# Patient Record
Sex: FEMALE | Race: WHITE | Employment: FULL TIME | URBAN - METROPOLITAN AREA
[De-identification: names, ages, dates, MRNs, and addresses within clinical notes are randomized per-mention and may not be internally consistent; named-entity substitution may affect disease eponyms.]

---

## 2020-10-05 ENCOUNTER — OFFICE VISIT (OUTPATIENT)
Dept: ORTHOPEDIC SURGERY | Age: 65
End: 2020-10-05
Payer: COMMERCIAL

## 2020-10-05 VITALS — TEMPERATURE: 97.4 F

## 2020-10-05 PROCEDURE — 99203 OFFICE O/P NEW LOW 30 MIN: CPT | Performed by: PHYSICIAN ASSISTANT

## 2020-10-05 NOTE — LETTER
[facility]  [facility] [address] [address]  Phone: [phone]  Fax: [phone]    Buck Wright        October 5, 2020     Patient: Reji Guo   YOB: 1955   Date of Visit: 10/5/2020       To Whom it May Concern:    Franky Mak was seen in my clinic on 10/5/2020. If you have any questions or concerns, please don't hesitate to call.     Sincerely,           EMIL Wright

## 2020-10-05 NOTE — LETTER
[facility]  [facility] [address] [address]  Phone: [phone]  Fax: [phone]    Buck Thomas        October 5, 2020     Patient: Reuben Lira   YOB: 1955   Date of Visit: 10/5/2020       To Whom It May Concern: It is my medical opinion that Akshat Amor will miss work on October 8, 2020 for a period of time for a doctors appointment. If you have any questions or concerns, please don't hesitate to call.     Sincerely,          EMIL Thomas

## 2020-10-06 PROBLEM — M25.561 RIGHT KNEE PAIN: Status: ACTIVE | Noted: 2020-10-06

## 2020-10-06 PROBLEM — M25.551 RIGHT HIP PAIN: Status: ACTIVE | Noted: 2020-10-06

## 2020-10-06 PROBLEM — M16.11 ARTHRITIS OF RIGHT HIP: Status: ACTIVE | Noted: 2020-10-06

## 2020-10-06 NOTE — PROGRESS NOTES
Subjective:      Patient ID: Anais Gomez is a 60 y.o. female. Chief Complaint   Patient presents with    Hip Problem     Right hip    Knee Problem     Right knee        HPI:   She is here for an initial evaluation of right hip pain. Onset of symptoms years. These symptoms have been progressive in nature. There is not a history of injury. Pain is constant, moderate. Location of pain buttock, groin, anterior thigh to her right knee. . Quality of pain is aching and throbbing. Pain is on average 9/10. Pain is worse with weight bearing activity. Pain improves with rest and elevation. There typically is not associated numbness/ tingling. Previous treatments have included: Tylenol with mild relief or improvement. Review of Systems:   A 14 point review of systems and history form completed by the patient has been reviewed. This form is scanned in the media tab of the patient's chart under today's date. Past Medical History:   Diagnosis Date    Allergic rhinitis     Hyperlipidemia     Hypothyroidism     Iron deficiency anemia     Type II or unspecified type diabetes mellitus without mention of complication, not stated as uncontrolled        History reviewed. No pertinent family history. Past Surgical History:   Procedure Laterality Date    APPENDECTOMY      CHOLECYSTECTOMY      gallstones    GASTRIC BYPASS SURGERY      TUBAL LIGATION      UTERINE FIBROID SURGERY         Social History     Occupational History    Not on file   Tobacco Use    Smoking status: Never Smoker    Smokeless tobacco: Never Used   Substance and Sexual Activity    Alcohol use: No    Drug use: No    Sexual activity: Never       Current Outpatient Medications   Medication Sig Dispense Refill    sitaGLIPtan (JANUVIA) 100 MG tablet Take 1 tablet by mouth daily.  14 tablet 0    levothyroxine (SYNTHROID) 25 MCG tablet TAKE ONE TABLET BY MOUTH EVERY DAY 30 tablet 1    metFORMIN ER (GLUCOPHAGE-XR) 500 MG XR tablet TAKE ONE TABLET BY MOUTH EVERY DAY 30 tablet 1    ramipril (ALTACE) 2.5 MG capsule TAKE ONE CAPSULE BY MOUTH EVERY DAY 30 capsule 1    rosuvastatin (CRESTOR) 10 MG tablet Take 1 tablet by mouth daily. 28 tablet 0    vitamin B-12 (CYANOCOBALAMIN) 500 MCG tablet TAKE 1 TABLET BY MOUTH ONCE DAILY 30 tablet 2    mometasone (NASONEX) 50 MCG/ACT nasal spray 2 sprays by Nasal route daily. 1 Inhaler 0    EVISTA 60 MG tablet TAKE ONE TABLET BY MOUTH EVERY DAY 30 tablet 2    EVISTA 60 MG tablet TAKE ONE TABLET BY MOUTH EVERY DAY 30 tablet 1    Vitamin D (CHOLECALCIFEROL) 1000 UNITS CAPS capsule Take 1,000 Units by mouth daily.  cetirizine-psuedoephedrine (ZYRTEC-D ALLERGY & CONGESTION) 5-120 MG per tablet TAKE ONE TABLET BY MOUTH TWICE A DAY 60 tablet 1    glucose blood VI test strips (ACCU-CHEK CHARLEY) strip [employer] brand 100 strip 2    Blood Glucose Monitoring Suppl ANATOLIY [employer] brand 1 Device 0    metformin (GLUCOPHAGE XR) 500 MG XR tablet Take 500 mg by mouth. 30 tablet 3     Current Facility-Administered Medications   Medication Dose Route Frequency Provider Last Rate Last Dose    bupivacaine PF (MARCAINE) 0.25 % injection 75 mg  30 mL Intradermal Once Michalene Gosselin, MD        mepivacaine (CARBOCAINE) 1 % injection 5 mg  5 mg Intradermal Once Denise Ch MD        methylPREDNISolone acetate (DEPO-MEDROL) injection 40 mg  40 mg Intra-Lesional Once Denise Ch MD        cyanocobalamin injection 100 mcg  100 mcg Intramuscular Once Denise Ch MD             Objective:   She is alert, oriented x 3, pleasant, well nourished, developed and in no acute distress. Temp 97.4 °F (36.3 °C) (Temporal)      HIP EXAM:  Examination of the Right hip shows: There is no deformity. There is not erythema. There is moderate pain with internal and external rotation. There is moderate pain with flexion and extension. There is moderate pain with active SLR.   ROM diminished range of motion with pain.  Trochanteric region is not tender to palpation. Sacral Iliac is not tender to palpation. There is moderate pain with weight bearing. HIP EXAM:  Examination of the Left hip shows: There is no deformity. There is not erythema. There is no pain with internal and external rotation. There is no pain with flexion and extension. There is no pain with active SLR. ROM full range of motion. Trochanteric region is not tender to palpation. Sacral Iliac is not tender to palpation. There is no pain with weight bearing. NEUROLOGICAL EXAM:  Examination of the lower extremities are intact with sensation to light touch. Motor testing 4+ o 5/5 in all major motor groups including hip abductors, hip adductors,  quadriceps, hamstring, dorsi flexors and EHL testing. Gait is antalgic. Negative Longoria's Sign. SLR negative. VASCULAR EXAM:  Examination of the lower extremities shows intact perfusion to all extremities. No cyanosis. Digits are warm to touch, capillary refill is less than 2 seconds. no edema noted. SKIN:  Examination of the lower extremity skin reveals: The skin to be intact without lacerations or abrasions. No significant erythema. No rashes or skin lesions. X Rays: performed in the office today:   AP Pelvis, AP and Frog Leg Lateral  Right Hip:    The alignment is anatomic. There are radiographic findings to suggest moderate degenerative changes of the right hip. There are no radiographic findings to suggest fracture or dislocation. AP Standing, Lateral and Sunrise views of right knee: There is mild osteoarthritic findings of the right knee noted with mild joint space narrowing and early sclerosis of the medial compartment. No acute fractures or dislocations noted. Assessment:       ICD-10-CM    1. Right hip pain  M25.551 XR HIP 2-3 VW W PELVIS RIGHT   2. Right knee pain, unspecified chronicity  M25.561 XR KNEE RIGHT (3 VIEWS)   3.  Arthritis of right hip M16.11         Plan:     I did spend 30 minutes in the office today with greater than 50% of this time counseling, reviewing diagnostic tests, face to face discussion concerning their diagnosis and treatment options. All of their questions were answered. Right buttock and groin pain that radiates down into the thigh to the knee which I believe is related to right hip arthritis. Other considerations are lumbar radicular pain and knee arthritis. The natural history of the patient's diagnosis as well as the treatment options were discussed in full and questions were answered. Risks and benefits of the treatment options also reviewed in detail. Possible Weight loss, activity modification, home exercise therapy program, NSAID'S, dietary changes have been discussed as a means to help control the symptoms. Step approach discussed for treatment of hip arthritis including the use of NSAID'S, cortisone injections as well as surgical procedures such as arthroplasty. I have recommended intra articular injection of the right hip for both diagnostic and therapeutic purpose. This will be performed under fluoroscopy to be sure the injection is indeed intra articular. This will be performed by Dr Nirav Jj and scheduled in the near future. The risks and benefits were discussed in detail today. All questions were answered. She verbally consents to the procedure of cortisone hip injection(s). Follow Up: 6 weeks. Call or return to clinic prn if these symptoms worsen or fail to improve as anticipated.

## 2020-10-15 ENCOUNTER — HOSPITAL ENCOUNTER (OUTPATIENT)
Dept: GENERAL RADIOLOGY | Age: 65
Discharge: HOME | End: 2020-10-15
Payer: COMMERCIAL

## 2020-10-15 ENCOUNTER — OFFICE VISIT (OUTPATIENT)
Dept: ORTHOPEDIC SURGERY | Age: 65
End: 2020-10-15
Payer: COMMERCIAL

## 2020-10-15 PROCEDURE — 77002 NEEDLE LOCALIZATION BY XRAY: CPT

## 2020-10-15 PROCEDURE — 6360000002 HC RX W HCPCS

## 2020-10-15 PROCEDURE — 20610 DRAIN/INJ JOINT/BURSA W/O US: CPT

## 2020-10-15 PROCEDURE — 2500000003 HC RX 250 WO HCPCS

## 2020-11-12 ENCOUNTER — OFFICE VISIT (OUTPATIENT)
Dept: ORTHOPEDIC SURGERY | Age: 65
End: 2020-11-12
Payer: COMMERCIAL

## 2020-11-12 VITALS — BODY MASS INDEX: 33.13 KG/M2 | HEIGHT: 62 IN | WEIGHT: 180 LBS | TEMPERATURE: 97.2 F

## 2020-11-12 PROCEDURE — 99213 OFFICE O/P EST LOW 20 MIN: CPT | Performed by: ORTHOPAEDIC SURGERY

## 2020-11-12 NOTE — PROGRESS NOTES
[facility]  Office Visit    Chief Complaint: Follow-up on right hip pain. Low back pain. HPI:  Srinivasa Li is a 60 y.o. who is here in follow-up of right hip pain. She underwent an injection on October 15, 2020 with Dr. Gayla Paniagua. This did help her pain that was on her lateral anterior hip and in her thigh. However, she continues to notice that she has pain in her upper lumbar spine. This pain is central and radiates to the right flank. This has been present for the last 6 months and has been worsening lately. This pain does not radiate down her legs. She has tried NSAIDs, but these do not help. She denies recent history of back injury. Patient Active Problem List   Diagnosis    Type II or unspecified type diabetes mellitus without mention of complication, not stated as uncontrolled    Hyperlipidemia    Hypothyroidism    Iron deficiency anemia    Vitamin B12 deficiency    Bursitis of hip    Dermatophytosis of nail    Menopause    Greater trochanteric bursitis of right hip    Rotator cuff tendonitis    Impingement syndrome of right shoulder    AC (acromioclavicular) joint arthritis    Biceps tendonitis    Right hip pain    Right knee pain    Arthritis of right hip       ROS:  Constitutional: denies fever, chills, weight loss  MSK: denies pain in other joints, muscle aches  Neurological: denies numbness, tingling, weakness    Exam:  Temperature 97.2 °F (36.2 °C), temperature source Infrared, height 5' 2\" (1.575 m), weight 180 lb (81.6 kg). Appearance: sitting in exam room chair, appears to be in no acute distress, awake and alert  Resp: unlabored breathing on room air  Skin: warm, dry and intact with out erythema or significant increased temperature  Neuro: grossly intact both lower extremities. Intact sensation to light touch. Motor exam 4+ to 5/5 in all major motor groups.   MSK: Spine -tender over the midline of the lower thoracic and upper lumbar spine.  Right hip -negative Stinchfield. No pain with passive range of motion. Imagin views of the thoracolumbar spine were obtained and reviewed. Significant for degenerative scoliosis and degenerative disc disease, most significant at L4/5. Assessment:  Right hip osteoarthritis  Degenerative scoliosis    Plan:  We discussed the diagnosis and treatment options. She is still receiving good relief from her right hip steroid injection. However, she continues to have midline and right-sided thoracolumbar back pain. This is in the area of her degenerative scoliosis. Due to the midline pain, we will obtain an MRI to further evaluate the vertebrae at this level to evaluate for fracture versus discitis. We will see her back after the MRI. This dictation was done with [employer] dictation and may contain mechanical errors related to translation.

## 2020-12-07 ENCOUNTER — OFFICE VISIT (OUTPATIENT)
Dept: ORTHOPEDIC SURGERY | Age: 65
End: 2020-12-07
Payer: COMMERCIAL

## 2020-12-07 VITALS — TEMPERATURE: 97.2 F

## 2020-12-07 PROCEDURE — 99213 OFFICE O/P EST LOW 20 MIN: CPT | Performed by: ORTHOPAEDIC SURGERY

## 2020-12-07 NOTE — PROGRESS NOTES
motion. Imaging:  MRI of the lumbar spine was reviewed and is significant for mild central canal stenosis at L4/5, as well as mild bilateral L4/5 foraminal stenosis. Assessment:  Right hip osteoarthritis  Degenerative scoliosis    Plan: We reviewed the MRI results. She continues to have midline and right-sided thoracolumbar back pain. This is in the area of her degenerative scoliosis. The pain is likely related to muscle strain. We discussed doing formal physical therapy and a home exercise program versus continue to monitor. She would like to hold off on formal physical therapy at this time. She may pursue massage therapy in the future. She will follow-up as needed. This dictation was done with [employer] dictation and may contain mechanical errors related to translation.